# Patient Record
Sex: FEMALE | Race: WHITE | NOT HISPANIC OR LATINO | Employment: FULL TIME | URBAN - METROPOLITAN AREA
[De-identification: names, ages, dates, MRNs, and addresses within clinical notes are randomized per-mention and may not be internally consistent; named-entity substitution may affect disease eponyms.]

---

## 2017-03-07 ENCOUNTER — OFFICE VISIT - HEALTHEAST (OUTPATIENT)
Dept: FAMILY MEDICINE | Facility: CLINIC | Age: 47
End: 2017-03-07

## 2017-03-07 DIAGNOSIS — R51.9 HEADACHE: ICD-10-CM

## 2017-04-04 ENCOUNTER — OFFICE VISIT - HEALTHEAST (OUTPATIENT)
Dept: FAMILY MEDICINE | Facility: CLINIC | Age: 47
End: 2017-04-04

## 2017-04-04 DIAGNOSIS — R51.9 HEADACHE: ICD-10-CM

## 2017-04-19 ENCOUNTER — COMMUNICATION - HEALTHEAST (OUTPATIENT)
Dept: FAMILY MEDICINE | Facility: CLINIC | Age: 47
End: 2017-04-19

## 2017-04-19 DIAGNOSIS — R51.9 HEADACHE: ICD-10-CM

## 2017-07-05 ENCOUNTER — COMMUNICATION - HEALTHEAST (OUTPATIENT)
Dept: FAMILY MEDICINE | Facility: CLINIC | Age: 47
End: 2017-07-05

## 2017-07-05 DIAGNOSIS — R51.9 HEADACHE: ICD-10-CM

## 2017-07-25 ENCOUNTER — OFFICE VISIT - HEALTHEAST (OUTPATIENT)
Dept: FAMILY MEDICINE | Facility: CLINIC | Age: 47
End: 2017-07-25

## 2017-07-25 DIAGNOSIS — R51.9 HEADACHE: ICD-10-CM

## 2017-07-25 DIAGNOSIS — R42 DIZZINESS: ICD-10-CM

## 2017-08-01 ENCOUNTER — COMMUNICATION - HEALTHEAST (OUTPATIENT)
Dept: FAMILY MEDICINE | Facility: CLINIC | Age: 47
End: 2017-08-01

## 2017-08-01 DIAGNOSIS — R51.9 HEADACHE: ICD-10-CM

## 2017-08-01 DIAGNOSIS — R42 DIZZINESS: ICD-10-CM

## 2017-08-10 ENCOUNTER — COMMUNICATION - HEALTHEAST (OUTPATIENT)
Dept: FAMILY MEDICINE | Facility: CLINIC | Age: 47
End: 2017-08-10

## 2017-08-10 DIAGNOSIS — R51.9 HEADACHE: ICD-10-CM

## 2017-08-10 DIAGNOSIS — R42 DIZZINESS: ICD-10-CM

## 2017-08-15 ENCOUNTER — OFFICE VISIT - HEALTHEAST (OUTPATIENT)
Dept: FAMILY MEDICINE | Facility: CLINIC | Age: 47
End: 2017-08-15

## 2017-08-15 ENCOUNTER — COMMUNICATION - HEALTHEAST (OUTPATIENT)
Dept: TELEHEALTH | Facility: CLINIC | Age: 47
End: 2017-08-15

## 2017-08-15 DIAGNOSIS — R42 DIZZINESS: ICD-10-CM

## 2017-09-01 ENCOUNTER — COMMUNICATION - HEALTHEAST (OUTPATIENT)
Dept: FAMILY MEDICINE | Facility: CLINIC | Age: 47
End: 2017-09-01

## 2017-09-01 DIAGNOSIS — R51.9 HEADACHE: ICD-10-CM

## 2017-09-24 ENCOUNTER — HEALTH MAINTENANCE LETTER (OUTPATIENT)
Age: 47
End: 2017-09-24

## 2017-10-11 ENCOUNTER — OFFICE VISIT - HEALTHEAST (OUTPATIENT)
Dept: FAMILY MEDICINE | Facility: CLINIC | Age: 47
End: 2017-10-11

## 2017-10-11 DIAGNOSIS — H93.19 TINNITUS: ICD-10-CM

## 2017-10-11 DIAGNOSIS — R42 DIZZINESS: ICD-10-CM

## 2017-10-19 ENCOUNTER — COMMUNICATION - HEALTHEAST (OUTPATIENT)
Dept: OTOLARYNGOLOGY | Facility: CLINIC | Age: 47
End: 2017-10-19

## 2018-01-06 ENCOUNTER — COMMUNICATION - HEALTHEAST (OUTPATIENT)
Dept: FAMILY MEDICINE | Facility: CLINIC | Age: 48
End: 2018-01-06

## 2018-01-06 DIAGNOSIS — R51.9 HEADACHE: ICD-10-CM

## 2021-05-30 VITALS — BODY MASS INDEX: 25.7 KG/M2 | WEIGHT: 164.06 LBS

## 2021-05-30 VITALS — BODY MASS INDEX: 25.62 KG/M2 | WEIGHT: 163.56 LBS

## 2021-05-31 VITALS — BODY MASS INDEX: 25.42 KG/M2 | WEIGHT: 162.31 LBS

## 2021-05-31 VITALS — BODY MASS INDEX: 25.37 KG/M2 | WEIGHT: 162 LBS

## 2021-05-31 VITALS — BODY MASS INDEX: 25.06 KG/M2 | WEIGHT: 160 LBS

## 2021-06-03 ENCOUNTER — RECORDS - HEALTHEAST (OUTPATIENT)
Dept: ADMINISTRATIVE | Facility: CLINIC | Age: 51
End: 2021-06-03

## 2021-06-05 ENCOUNTER — RECORDS - HEALTHEAST (OUTPATIENT)
Dept: NEUROLOGY | Facility: CLINIC | Age: 51
End: 2021-06-05

## 2021-06-05 DIAGNOSIS — G43.909 MIGRAINE: ICD-10-CM

## 2021-06-05 DIAGNOSIS — R42 DIZZINESS AND GIDDINESS: ICD-10-CM

## 2021-06-09 NOTE — PROGRESS NOTES
46-year-old female with chronic migraine who is only taking Imitrex for abortive therapy.  She has 10 or less headaches per month but they can last up to 12 hours even with abortive treatment. I will reintroduce Topamax for preventative therapy.  She is to follow-up via phone call in 1 month with an update.       ASSESSMENT/PLAN:  1. Headache  - topiramate (TOPAMAX) 25 MG tablet; Take 1 tablet (25 mg total) by mouth bedtime.  Dispense: 30 tablet; Refill: 0  - SUMAtriptan (IMITREX) 25 MG tablet; Take 1 tablet (25 mg total) by mouth every 2 (two) hours as needed for migraine. Do not exceed 200mg/24 hours  Dispense: 10 tablet; Refill: 3    CHIEF COMPLAINT:  Chief Complaint   Patient presents with     Follow-up     migraine     Medication Refill     imitrex       HISTORY OF PRESENT ILLNESS:  Palma is a 46 y.o. female presenting to the clinic today for a follow up for migraines. She has gotten them sine she was 11 years old. The have significantly worsened in the past 2-3 years. The headaches are similar in presentation.  She gets about 10 days of headache free per month.  She has about 5-9 intense headaches per month.  Her headaches can last more than one day. She does take Imitrex for a headache, and this can cut down the duration of the headache. She did try Topamax, and she had clearance of her headaches, and stopped taking it.  She can use her whole Imitrex prescription some months. She grades her headaches at a 7/10. She has missed worked due to headaches. She has nausea, dizziness, blurred vision, photophobia and sensitivities to sound during a headache. She feels her headaches in the back of her head and she has some associated neck tightness. She cannot pinpoint any triggers to her headaches.     REVIEW OF SYSTEMS:    No new medications, diets, stress. No change in appetite. No fevers or chills.   All other systems are negative.    PFSH:  No new history.     TOBACCO USE:  History   Smoking Status     Never  Smoker   Smokeless Tobacco     Never Used       VITALS:  Vitals:    03/07/17 1121   BP: 92/52   Patient Site: Left Arm   Patient Position: Sitting   Cuff Size: Adult Regular   Pulse: 72   Weight: 163 lb 9 oz (74.2 kg)     Wt Readings from Last 3 Encounters:   03/07/17 163 lb 9 oz (74.2 kg)   08/31/16 158 lb 12.8 oz (72 kg)   05/18/16 161 lb (73 kg)       PHYSICAL EXAM:  Constitutional: Patient is oriented to person, place, and time. Patient appears well-developed and well-nourished. No distress.   Cardiovascular: Normal rate.  Pulmonary/Chest: Effort normal. No respiratory distress.   Neurological: Patient is alert and oriented to person, place, and time.      Results for orders placed or performed in visit on 10/21/14   ELECTROLYTE PROFILE   Result Value Ref Range    Sodium 142 136 - 145 mmol/L    Potassium 4.0 3.5 - 5.0 mmol/L    CO2 21 (L) 22 - 31 mmol/L    Chloride 111 (H) 98 - 107 mmol/L    Anion Gap, Calculation 10 5 - 18 mmol/L         ADDITIONAL HISTORY SUMMARIZED (2): None.  DECISION TO OBTAIN EXTRA INFORMATION (1): None.   RADIOLOGY TESTS (1): None.  LABS (1): None.  MEDICINE TESTS (1): None.  INDEPENDENT REVIEW (2 each): None.     The visit lasted a total of 18 minutes face to face with the patient. Over 50% of the time was spent counseling and educating the patient about headaches.    IAlexandra, am scribing for and in the presence of, Dr. Cole.    I, Dr. Cole, personally performed the services described in this documentation, as scribed by Alexandra Godwin in my presence, and it is both accurate and complete.    MEDICATIONS:  Current Outpatient Prescriptions   Medication Sig Dispense Refill     CALCIUM & MAGNESIUM CARBONATES ORAL Take by mouth.       ERGOCALCIFEROL, VITAMIN D2, (VITAMIN D2 ORAL) Take by mouth.       FEXOFENADINE HCL (ALLEGRA ORAL) Take by mouth.       SUMAtriptan (IMITREX) 25 MG tablet Take 1 tablet (25 mg total) by mouth every 2 (two) hours as needed for  migraine. Do not exceed 200mg/24 hours 10 tablet 3     topiramate (TOPAMAX) 25 MG tablet Take 1 tablet (25 mg total) by mouth bedtime. 30 tablet 0     No current facility-administered medications for this visit.        Total data points: 0       n/a

## 2021-06-09 NOTE — PROGRESS NOTES
46-year-old female with headaches.  Headaches are better with the initiation of Topamax 25 mg.  She has been having about 3-4 migraines a month but only requiring the use of Imitrex 1-2 times per month.  The frequency of headaches have lessened since starting Topamax.  Continue with Topamax and as needed Imitrex.  Follow-up in 6 months to 12 months.  FMLA form signed and a copy provided to the patient.    ASSESSMENT/PLAN:  1. Headache    CHIEF COMPLAINT:  Chief Complaint   Patient presents with     Follow-up     migraine, lot better     Fmla Paperwork     Will bring later       HISTORY OF PRESENT ILLNESS:  Palma is a 46 y.o. female presenting to the clinic today for a migraine follow. Restarting the Topamax has worked well for her. She has only need Imitrex 2 times after restarted the Topamax. She feels like her lingering constant headache has subsided. Her appetite has been stable on the Topamax. She would also like her FMLA updated for her migraines.     Headaches went from 10 days per month to about 3-4 days per month with the initiation of Topamax.  Previously headaches were lasting 12+ hours per day.  At present there lasting 1-2 hours per day.  Patient has noted a significant improvement with Topamax.    REVIEW OF SYSTEMS:   Constitutional: Negative.   HENT: Negative.   Eyes: Negative.   Respiratory: Negative.   Cardiovascular: Negative.   Gastrointestinal: Negative.   Endocrine: Negative.   Genitourinary: Negative.   Musculoskeletal: Negative.   Skin: Negative.   Allergic/Immunologic: Negative.   Neurological: Negative.   Hematological: Negative.   Psychiatric/Behavioral: Negative.   All other systems are negative.    PFSH:  No new history.     TOBACCO USE:  History   Smoking Status     Never Smoker   Smokeless Tobacco     Never Used       VITALS:  Vitals:    04/04/17 1015   BP: 98/52   Patient Site: Left Arm   Patient Position: Sitting   Cuff Size: Adult Regular   Pulse: 60   Weight: 164 lb 1 oz (74.4 kg)      Wt Readings from Last 3 Encounters:   04/04/17 164 lb 1 oz (74.4 kg)   03/07/17 163 lb 9 oz (74.2 kg)   08/31/16 158 lb 12.8 oz (72 kg)       PHYSICAL EXAM:  Constitutional: Patient is oriented to person, place, and time. Patient appears well-developed and well-nourished. No distress.   Cardiovascular: Normal rate.  Pulmonary/Chest: Effort normal. No respiratory distress.   Neurological: Patient is alert and oriented to person, place, and time.      Results for orders placed or performed in visit on 10/21/14   ELECTROLYTE PROFILE   Result Value Ref Range    Sodium 142 136 - 145 mmol/L    Potassium 4.0 3.5 - 5.0 mmol/L    CO2 21 (L) 22 - 31 mmol/L    Chloride 111 (H) 98 - 107 mmol/L    Anion Gap, Calculation 10 5 - 18 mmol/L           ADDITIONAL HISTORY SUMMARIZED (2): None.  DECISION TO OBTAIN EXTRA INFORMATION (1): None.   RADIOLOGY TESTS (1): None.  LABS (1): None.  MEDICINE TESTS (1): None.  INDEPENDENT REVIEW (2 each): None.     The visit lasted a total of 5 minutes face to face with the patient. Over 50% of the time was spent counseling and educating the patient about migraine management.    IAlexandra, am scribing for and in the presence of, Dr. Cole.    IDr. Cole, personally performed the services described in this documentation, as scribed by Alexandra Godwin in my presence, and it is both accurate and complete.    MEDICATIONS:  Current Outpatient Prescriptions   Medication Sig Dispense Refill     CALCIUM & MAGNESIUM CARBONATES ORAL Take by mouth.       ERGOCALCIFEROL, VITAMIN D2, (VITAMIN D2 ORAL) Take by mouth.       FEXOFENADINE HCL (ALLEGRA ORAL) Take by mouth.       ondansetron (ZOFRAN-ODT) 2 MG Take 4 mg by mouth every 8 (eight) hours as needed for nausea.       SUMAtriptan (IMITREX) 25 MG tablet Take 1 tablet (25 mg total) by mouth every 2 (two) hours as needed for migraine. Do not exceed 200mg/24 hours 10 tablet 3     topiramate (TOPAMAX) 25 MG tablet Take 1 tablet (25 mg  total) by mouth bedtime. 30 tablet 0     ondansetron (ZOFRAN) 8 MG tablet        No current facility-administered medications for this visit.        Total data points: 0

## 2021-06-12 NOTE — PROGRESS NOTES
47-year-old female with chronic migraine which is better on Topamax (started April 2017presents) presents with new onset dizziness without vertigo which started 4 weeks ago.  She also has associated tinnitus and change in hearing.  The patient is taking meclizine for the dizziness which has been helpful.  Timing of dizziness with Topamax may indicate medication side effects.  I will like her to wean off Topamax every other day for 2 weeks.  Continue with meclizine for 1 week and then discontinue.  Monitor for signs of dizziness and also for signs of worsening headaches.  Recent laboratory tests done by another provider were unremarkable.  Call in 2-3 weeks with an update.  May need head imaging.  May need referral to ENT to rule out Ménière's disease    ASSESSMENT/PLAN:  1. Dizziness    CHIEF COMPLAINT:  Chief Complaint   Patient presents with     Follow-up     migraine is better, but still dizzy is fine if taking med Antivert, if skip the med dizziness comes back       HISTORY OF PRESENT ILLNESS:  Palma is a 47 y.o. female presenting to the clinic today for a follow up for dizziness and headaches. Her headaches are improved. She is still having dizziness. The dizziness has been ongoing for about 1.5 months. She feels like the dizziness is worse. She did have some dizziness before the headaches and starting Topamax. She has been taking Topamax for the past 4 months, and has been on it prior to this. She has been taking constant meclizine, and this helps the dizziness. If she stops the meclizine, the dizziness comes back. Her dizziness is NOT room spinning, and not change in position related. She does have associated nausea. She can have the dizziness at rest or with activity. The episodes can be so severe that she can just lay down. She has had some feelings of imbalance with the dizziness. She is having intermittent ringing in the ears. She has felt like her hearing is slightly decreased. The ringing and hearing  changes are new with the dizziness. Vitals are stable. She had normal head CT three years ago.     REVIEW OF SYSTEMS:   Denies cough, cold, rhinorrhea, pressure in face. Denies otalgia. No changes in diet. No new medications or supplements. No vomiting or diarrhea. All other systems are negative.    PFSH:  No new history.     TOBACCO USE:  History   Smoking Status     Never Smoker   Smokeless Tobacco     Never Used       VITALS:  Vitals:    08/15/17 1028   BP: 110/70   Patient Site: Left Arm   Patient Position: Sitting   Cuff Size: Adult Regular   Pulse: 68   Weight: 162 lb 5 oz (73.6 kg)     Wt Readings from Last 3 Encounters:   08/15/17 162 lb 5 oz (73.6 kg)   07/25/17 160 lb (72.6 kg)   04/04/17 164 lb 1 oz (74.4 kg)       PHYSICAL EXAM:  Constitutional: Patient is oriented to person, place, and time. Patient appears well-developed and well-nourished. No distress.   Head: Normocephalic and atraumatic.   Right Ear: External ear normal.   Left Ear: External ear normal.   Nose: Nose normal.   Mouth/Throat: Oropharynx is clear and moist. No oropharyngeal exudate.   Eyes: Conjunctivae and EOM are normal. Pupils are equal, round, and reactive to light. Right eye exhibits no discharge. Left eye exhibits no discharge. No scleral icterus.   Neck: Neck supple. No JVD present. No tracheal deviation present. No thyromegaly present.   Cardiovascular: Normal rate, regular rhythm, normal heart sounds and intact distal pulses. No murmur heard.   Pulmonary/Chest: Effort normal and breath sounds normal. No stridor. No respiratory distress. Patient has no wheezes, no rales, exhibits no tenderness.   Abdominal: Soft. Bowel sounds are normal. Patient exhibits no distension and no mass. There is no tenderness. There is no rebound and no guarding.   Lymphadenopathy:  Patient has no cervical adenopathy.   Neurological: Patient is alert and oriented to person, place, and time. Patient has normal reflexes. No cranial nerve deficit.  Coordination normal.   Skin: Skin is warm and dry. No rash noted. Patient is not diaphoretic. No erythema. No pallor.    Results for orders placed or performed in visit on 07/25/17   HM2(CBC w/o Differential)   Result Value Ref Range    WBC 5.6 4.0 - 11.0 thou/uL    RBC 4.59 3.80 - 5.40 mill/uL    Hemoglobin 14.3 12.0 - 16.0 g/dL    Hematocrit 42.5 35.0 - 47.0 %    MCV 92 80 - 100 fL    MCH 31.0 27.0 - 34.0 pg    MCHC 33.6 32.0 - 36.0 g/dL    RDW 11.1 11.0 - 14.5 %    Platelets 192 140 - 440 thou/uL    MPV 9.0 7.0 - 10.0 fL   Comprehensive Metabolic Panel   Result Value Ref Range    Sodium 143 136 - 145 mmol/L    Potassium 4.6 3.5 - 5.0 mmol/L    Chloride 110 (H) 98 - 107 mmol/L    CO2 26 22 - 31 mmol/L    Anion Gap, Calculation 7 5 - 18 mmol/L    Glucose 88 70 - 125 mg/dL    BUN 16 8 - 22 mg/dL    Creatinine 0.81 0.60 - 1.10 mg/dL    GFR MDRD Af Amer >60 >60 mL/min/1.73m2    GFR MDRD Non Af Amer >60 >60 mL/min/1.73m2    Bilirubin, Total 0.7 0.0 - 1.0 mg/dL    Calcium 8.9 8.5 - 10.5 mg/dL    Protein, Total 6.7 6.0 - 8.0 g/dL    Albumin 3.7 3.5 - 5.0 g/dL    Alkaline Phosphatase 55 45 - 120 U/L    AST 17 0 - 40 U/L    ALT 13 0 - 45 U/L   Thyroid Cascade   Result Value Ref Range    TSH 1.83 0.30 - 5.00 uIU/mL           ADDITIONAL HISTORY SUMMARIZED (2): Reviewed note from Lynne Solitario NP regarding headaches and dizziness from 7/25/2017.  DECISION TO OBTAIN EXTRA INFORMATION (1): None.   RADIOLOGY TESTS (1): None.  LABS (1): Reviewed labs from 7/25/2017.  MEDICINE TESTS (1): None.  INDEPENDENT REVIEW (2 each): None.     Alexandra THAPA am scribing for and in the presence of, Dr. Cole.    I, Luis Sanches MD , personally performed the services described in this documentation, as scribed by Alexandra Godwin in my presence, and it is both accurate and complete.    MEDICATIONS:  Current Outpatient Prescriptions   Medication Sig Dispense Refill     CALCIUM & MAGNESIUM CARBONATES ORAL Take  by mouth.       ERGOCALCIFEROL, VITAMIN D2, (VITAMIN D2 ORAL) Take by mouth.       FEXOFENADINE HCL (ALLEGRA ORAL) Take by mouth.       meclizine (ANTIVERT) 12.5 mg tablet TAKE 2 TABLETS (25 MG TOTAL) BY MOUTH 3 (THREE) TIMES A DAY AS NEEDED FOR DIZZINESS OR NAUSEA. 60 tablet 10     ondansetron (ZOFRAN) 8 MG tablet        SUMAtriptan (IMITREX) 25 MG tablet Take 1 tablet (25 mg total) by mouth every 2 (two) hours as needed for migraine. Do not exceed 200mg/24 hours 10 tablet 3     topiramate (TOPAMAX) 25 MG tablet TAKE 1 TABLET (25 MG TOTAL) BY MOUTH BEDTIME. 30 tablet 11     No current facility-administered medications for this visit.        Total data points: 3

## 2021-06-12 NOTE — PROGRESS NOTES
"Assessment/Plan:        Diagnoses and all orders for this visit:    Headache  Dizziness  Neurologically intact today. Vital signs stable.   I suspect this increase dizziness could be from her prophylactic medication Topamax vs ?.   I will order labs below to rule out any underlying cause and follow up with results once received,   Prescribed meclizine to try tonight to see if that helps dizziness, discussed side effects as well.   Toradol given in office today and Palma did tolerate this well to assist with pain.  Reviewed last imaging from 2014.  I did recommend follow up with PCP next week to discuss imaging, vs neurology referral vs change in prophylactic medication.  Discussed red flags that would warrant urgent evaluation in [facility] or ED. Patient verbalized understanding.      -     HM2(CBC w/o Differential)  -     Comprehensive Metabolic Panel  -     Thyroid Cascade  -     meclizine (ANTIVERT) 12.5 mg tablet; Take 2 tablets (25 mg total) by mouth 3 (three) times a day as needed for dizziness or nausea.  Dispense: 20 tablet; Refill: 0  -     ketorolac injection 30 mg (TORADOL); Inject 1 mL (30 mg total) into the shoulder, thigh, or buttocks once.        Subjective:    Patient ID: Palma Fang is a 47 y.o. female.    HPI Comments: Palma is a 47 y.o. female presenting to the clinic today for evaluation of a headache/history of migraines and dizziness. States she has had migaraines for over 35 years. She has had them her entire life,  has oonly started treatment for her headaches two years ago.   Denies any aura, she does get sensitivity to light and sound - she gets them about once per week. She is unsure what her triggers are.   She is taking Topamax daily and she takes Imitrex, ibuprofen and Zofran when she gets them. Endorses recent increased dizziness.  She presents today with a migraine that started Sunday 7/23/2017 - She has been taking Imitrex, but this is not \"knocking it out\" as she states her headache " continues to linger. Palma is most concerned with her increased dizziness.  She states that dizziness and nausea started Sunday afternoon  - she states the dizziness is new, she will get dizziness but it doesn't typically last this long. She did take Zofran without relief.   She has been on topamax for at least one year.   Denies vertigo. Denies vision changes, balance or personality changes. She did have a formal eye evaluation within the year but notes she is using her reader eye glasses more and feels her vision is changing.   She is missing a lot of work again and feels that her symptoms are affecting her quality of life.    She has not taken anything over the counter. Rates 4/10 today  Denies fever, chills, recent viral illness. Denies worse headache of her life.   Does not have a current neurologist.     Headache    Associated symptoms include dizziness. Pertinent negatives include no numbness, seizures or weakness.       The following portions of the patient's history were reviewed and updated as appropriate: allergies, current medications, past family history, past medical history, past social history, past surgical history and problem list.    Review of Systems   Constitutional: Negative.    Respiratory: Negative.    Cardiovascular: Negative.    Neurological: Positive for dizziness and headaches. Negative for tremors, seizures, syncope, facial asymmetry, speech difficulty, weakness, light-headedness and numbness.           Objective:    Physical Exam   Constitutional: She is oriented to person, place, and time. She appears well-developed and well-nourished. No distress.   Cardiovascular: Normal rate and normal heart sounds.    No murmur heard.  Pulmonary/Chest: Effort normal and breath sounds normal.   Musculoskeletal: Normal range of motion.   Neurological: She is alert and oriented to person, place, and time. She displays normal reflexes. No cranial nerve deficit. She exhibits normal muscle tone.  Coordination normal.   Skin: She is not diaphoretic.   Psychiatric: She has a normal mood and affect. Her behavior is normal. Judgment normal.             Vitals:    07/25/17 1040   BP: 128/76   Patient Site: Left Arm   Patient Position: Sitting   Cuff Size: Adult Regular   Pulse: 68   Weight: 160 lb (72.6 kg)         Current Outpatient Prescriptions   Medication Sig Dispense Refill     CALCIUM & MAGNESIUM CARBONATES ORAL Take by mouth.       ERGOCALCIFEROL, VITAMIN D2, (VITAMIN D2 ORAL) Take by mouth.       FEXOFENADINE HCL (ALLEGRA ORAL) Take by mouth.       ondansetron (ZOFRAN) 8 MG tablet        ondansetron (ZOFRAN-ODT) 2 MG Take 4 mg by mouth every 8 (eight) hours as needed for nausea.       SUMAtriptan (IMITREX) 25 MG tablet Take 1 tablet (25 mg total) by mouth every 2 (two) hours as needed for migraine. Do not exceed 200mg/24 hours 10 tablet 3     topiramate (TOPAMAX) 25 MG tablet TAKE 1 TABLET (25 MG TOTAL) BY MOUTH BEDTIME. 30 tablet 11     meclizine (ANTIVERT) 12.5 mg tablet Take 2 tablets (25 mg total) by mouth 3 (three) times a day as needed for dizziness or nausea. 20 tablet 0     No current facility-administered medications for this visit.

## 2021-06-13 NOTE — PROGRESS NOTES
ASSESSMENT/PLAN:  47-year-old female with chronic migraine follows up on 2 months of dizziness and tinnitus without vertigo or hearing loss.  Initially this was thought related to topamax (for migraine) due to the timing of symptoms with timing of medication; however, dizziness and tinnitus persist even with discontinuation of topamax.  She has been taking meclizine daily for the last 2 months.  Will refer to ENT for further evaluation and management    Dizziness  -     Ambulatory referral to ENT    Tinnitus  -     Ambulatory referral to ENT    Orders Placed This Encounter   Procedures     Ambulatory referral to ENT     Referral Priority:   Routine     Referral Type:   Consultation     Referral Reason:   Evaluation and Treatment     Requested Specialty:   Otolaryngology     Number of Visits Requested:   1       CHIEF COMPLAINT:  Chief Complaint   Patient presents with     Follow-up     migraine     discuss [place] papers     Medication Refill       HISTORY OF PRESENT ILLNESS:  Palma is a 47 y.o. female presenting to the clinic today for a follow up for headaches and dizziness. The headaches seem stable. She is completely off of the Topamax. If she has a headache, the Imitrex works well for her. She is still experiencing the dizziness daily. She is taking meclizine twice daily; she takes one dose when she wakes up in the morning, and another dose midday for the dizziness. She is still having tinnitus. Denies any hearing loss or sense of decrease hearing.      She also comes with [facility] paperwork to be updated.     REVIEW OF SYSTEMS:   Constitutional: Negative.   HENT: Negative.   Eyes: Negative.   Respiratory: Negative.   Cardiovascular: Negative.   Gastrointestinal: Negative.   Endocrine: Negative.   Genitourinary: Negative.   Musculoskeletal: Negative.   Skin: Negative.   Allergic/Immunologic: Negative.   Neurological: Negative.   Hematological: Negative.   Psychiatric/Behavioral: Negative.   All other systems are  negative.    PFSH:  No new history.     TOBACCO USE:  History   Smoking Status     Never Smoker   Smokeless Tobacco     Never Used       VITALS:  Vitals:    10/11/17 1140   BP: 100/50   Pulse: (!) 56   Weight: 162 lb (73.5 kg)     Wt Readings from Last 3 Encounters:   10/11/17 162 lb (73.5 kg)   08/15/17 162 lb 5 oz (73.6 kg)   07/25/17 160 lb (72.6 kg)       PHYSICAL EXAM:  Constitutional: Patient is oriented to person, place, and time. Patient appears well-developed and well-nourished. No distress.   Head: Normocephalic and atraumatic.   Right Ear: External ear normal.   Left Ear: External ear normal.   Nose: Nose normal.   Eyes: Conjunctivae and EOM are normal. Right eye exhibits no discharge. Left eye exhibits no discharge. No scleral icterus.   Skin: Skin is warm and dry. No rash noted. Patient is not diaphoretic. No erythema. No pallor.    Results for orders placed or performed in visit on 07/25/17   HM2(CBC w/o Differential)   Result Value Ref Range    WBC 5.6 4.0 - 11.0 thou/uL    RBC 4.59 3.80 - 5.40 mill/uL    Hemoglobin 14.3 12.0 - 16.0 g/dL    Hematocrit 42.5 35.0 - 47.0 %    MCV 92 80 - 100 fL    MCH 31.0 27.0 - 34.0 pg    MCHC 33.6 32.0 - 36.0 g/dL    RDW 11.1 11.0 - 14.5 %    Platelets 192 140 - 440 thou/uL    MPV 9.0 7.0 - 10.0 fL   Comprehensive Metabolic Panel   Result Value Ref Range    Sodium 143 136 - 145 mmol/L    Potassium 4.6 3.5 - 5.0 mmol/L    Chloride 110 (H) 98 - 107 mmol/L    CO2 26 22 - 31 mmol/L    Anion Gap, Calculation 7 5 - 18 mmol/L    Glucose 88 70 - 125 mg/dL    BUN 16 8 - 22 mg/dL    Creatinine 0.81 0.60 - 1.10 mg/dL    GFR MDRD Af Amer >60 >60 mL/min/1.73m2    GFR MDRD Non Af Amer >60 >60 mL/min/1.73m2    Bilirubin, Total 0.7 0.0 - 1.0 mg/dL    Calcium 8.9 8.5 - 10.5 mg/dL    Protein, Total 6.7 6.0 - 8.0 g/dL    Albumin 3.7 3.5 - 5.0 g/dL    Alkaline Phosphatase 55 45 - 120 U/L    AST 17 0 - 40 U/L    ALT 13 0 - 45 U/L   Thyroid Cascade   Result Value Ref Range    TSH 1.83 0.30  - 5.00 uIU/mL           ADDITIONAL HISTORY SUMMARIZED (2): None.  DECISION TO OBTAIN EXTRA INFORMATION (1): None.   RADIOLOGY TESTS (1): None.  LABS (1): None.  MEDICINE TESTS (1): None.  INDEPENDENT REVIEW (2 each): None.     The visit lasted a total of 5 minutes face to face with the patient. Over 50% of the time was spent counseling and educating the patient about her dizziness.    I, Alexandra Godwin, am scribing for and in the presence of, Dr. Cole.    ILuis MD , personally performed the services described in this documentation, as scribed by Alexandra Godwin in my presence, and it is both accurate and complete.    MEDICATIONS:  Current Outpatient Prescriptions   Medication Sig Dispense Refill     CALCIUM & MAGNESIUM CARBONATES ORAL Take by mouth.       ERGOCALCIFEROL, VITAMIN D2, (VITAMIN D2 ORAL) Take by mouth.       FEXOFENADINE HCL (ALLEGRA ORAL) Take by mouth.       meclizine (ANTIVERT) 12.5 mg tablet TAKE 2 TABLETS (25 MG TOTAL) BY MOUTH 3 (THREE) TIMES A DAY AS NEEDED FOR DIZZINESS OR NAUSEA. 60 tablet 10     ondansetron (ZOFRAN) 8 MG tablet Take 1 tablet (8 mg total) by mouth every 8 (eight) hours as needed for nausea. 30 tablet 1     SUMAtriptan (IMITREX) 25 MG tablet Take 1 tablet (25 mg total) by mouth every 2 (two) hours as needed for migraine. Do not exceed 200mg/24 hours 10 tablet 3     ondansetron (ZOFRAN) 8 MG tablet        topiramate (TOPAMAX) 25 MG tablet TAKE 1 TABLET (25 MG TOTAL) BY MOUTH BEDTIME. 30 tablet 11     No current facility-administered medications for this visit.        Total data points: 0

## 2022-07-21 ENCOUNTER — TRANSFERRED RECORDS (OUTPATIENT)
Dept: HEALTH INFORMATION MANAGEMENT | Facility: CLINIC | Age: 52
End: 2022-07-21

## 2022-07-28 ENCOUNTER — LAB REQUISITION (OUTPATIENT)
Dept: LAB | Facility: CLINIC | Age: 52
End: 2022-07-28

## 2022-07-28 PROCEDURE — 86481 TB AG RESPONSE T-CELL SUSP: CPT | Performed by: INTERNAL MEDICINE

## 2022-07-30 LAB
GAMMA INTERFERON BACKGROUND BLD IA-ACNC: 0.06 IU/ML
M TB IFN-G BLD-IMP: NEGATIVE
M TB IFN-G CD4+ BCKGRND COR BLD-ACNC: 3.84 IU/ML
MITOGEN IGNF BCKGRD COR BLD-ACNC: 0 IU/ML
MITOGEN IGNF BCKGRD COR BLD-ACNC: 0 IU/ML
QUANTIFERON MITOGEN: 3.9 IU/ML
QUANTIFERON NIL TUBE: 0.06 IU/ML
QUANTIFERON TB1 TUBE: 0.06 IU/ML
QUANTIFERON TB2 TUBE: 0.06

## 2023-03-29 ENCOUNTER — ANCILLARY PROCEDURE (OUTPATIENT)
Dept: MAMMOGRAPHY | Facility: CLINIC | Age: 53
End: 2023-03-29
Attending: FAMILY MEDICINE
Payer: COMMERCIAL

## 2023-03-29 DIAGNOSIS — Z12.31 VISIT FOR SCREENING MAMMOGRAM: ICD-10-CM

## 2023-03-29 PROCEDURE — 77067 SCR MAMMO BI INCL CAD: CPT

## 2023-05-15 ENCOUNTER — OFFICE VISIT (OUTPATIENT)
Dept: FAMILY MEDICINE | Facility: CLINIC | Age: 53
End: 2023-05-15
Payer: COMMERCIAL

## 2023-05-15 VITALS
SYSTOLIC BLOOD PRESSURE: 123 MMHG | HEART RATE: 69 BPM | OXYGEN SATURATION: 98 % | RESPIRATION RATE: 16 BRPM | BODY MASS INDEX: 23.52 KG/M2 | DIASTOLIC BLOOD PRESSURE: 83 MMHG | WEIGHT: 150.2 LBS | TEMPERATURE: 97.7 F

## 2023-05-15 DIAGNOSIS — M79.671 RIGHT FOOT PAIN: Primary | ICD-10-CM

## 2023-05-15 PROCEDURE — 99203 OFFICE O/P NEW LOW 30 MIN: CPT | Performed by: PREVENTIVE MEDICINE

## 2023-05-15 RX ORDER — OXYCODONE AND ACETAMINOPHEN 5; 325 MG/1; MG/1
1 TABLET ORAL EVERY 6 HOURS PRN
Qty: 12 TABLET | Refills: 0 | Status: SHIPPED | OUTPATIENT
Start: 2023-05-15 | End: 2023-05-18

## 2023-05-16 NOTE — PROGRESS NOTES
Assessment & Plan   1. Right foot pain  - oxyCODONE-acetaminophen (PERCOCET) 5-325 MG tablet; Take 1 tablet by mouth every 6 hours as needed for pain  Dispense: 12 tablet; Refill: 0    Wound healing well.    Tylenol 500 mg three times per day  Elevate, ice  Percocet for breakthrough pain             No follow-ups on file.    Easton Carroll MD  [facility]    Subjective     Palma Fang is a 52 year old year old female who presents to clinic today for the following health issues:    Patient presents with:  Foot Injury: Seen ED yesterday. Rt foot pain.    This is a 51 yo female who presents with r foot pain for 1 days.  Had a drill bit injure her right great toe yesterday and was seen in the ED and had 2 sutures placed.  Toe was numbed so she wasn't having pain but now is having a lot of pain.  Has tried tylenol and ibuprofen which have not helped.      Patient Active Problem List   Diagnosis     High-risk pregnancy, elderly primigravida     CARDIOVASCULAR SCREENING; LDL GOAL LESS THAN 160       Current Outpatient Medications   Medication     oxyCODONE-acetaminophen (PERCOCET) 5-325 MG tablet     PRENATAL VITAMIN TABS   OR     REGLAN 10 MG OR TABS     ZOFRAN 4 MG OR TABS     No current facility-administered medications for this visit.       No past medical history on file.    Social History   reports that she has never smoked. She has never used smokeless tobacco. She reports that she does not drink alcohol and does not use drugs.    Family History   Problem Relation Age of Onset     Breast Cancer Maternal Grandmother      Breast Cancer Paternal Aunt        Review of Systems  Constitutional, HEENT, cardiovascular, pulmonary, GI, , musculoskeletal, neuro, skin, endocrine and psych systems are negative, except as otherwise noted.      Objective    /83   Pulse 69   Temp 97.7  F (36.5  C) (Oral)   Resp 16   Wt 68.1 kg (150 lb 3.2 oz)   SpO2 98%   BMI 23.52 kg/m     Physical Exam   GENERAL: healthy, alert and no distress  EYES: Eyes grossly normal to inspection, PERRL and conjunctivae and sclerae normal  HENT: ear canals and TM's normal, nose and mouth without ulcers or lesions  NECK: no adenopathy, no asymmetry, masses, or scars and thyroid normal to palpation  RESP: lungs clear to auscultation - no rales, rhonchi or wheezes  BREAST: normal without masses, tenderness or nipple discharge and no palpable axillary masses or adenopathy  CV: regular rate and rhythm, normal S1 S2, no S3 or S4, no murmur, click or rub, no peripheral edema and peripheral pulses strong  ABDOMEN: soft, nontender, no hepatosplenomegaly, no masses and bowel sounds normal  MS: no gross musculoskeletal defects noted, no edema  SKIN: no suspicious lesions or rashes  NEURO: Normal strength and tone, mentation intact and speech normal  PSYCH: mentation appears normal, affect normal/bright  FEET - r second toe with mild swelling and ttp, nl cms.  Wound healing.

## 2023-06-05 ENCOUNTER — OFFICE VISIT (OUTPATIENT)
Dept: FAMILY MEDICINE | Facility: CLINIC | Age: 53
End: 2023-06-05
Payer: COMMERCIAL

## 2023-06-05 VITALS
DIASTOLIC BLOOD PRESSURE: 72 MMHG | TEMPERATURE: 97 F | OXYGEN SATURATION: 100 % | SYSTOLIC BLOOD PRESSURE: 107 MMHG | HEART RATE: 70 BPM

## 2023-06-05 DIAGNOSIS — L08.9 TOE INFECTION: Primary | ICD-10-CM

## 2023-06-05 PROCEDURE — 99203 OFFICE O/P NEW LOW 30 MIN: CPT | Performed by: NURSE PRACTITIONER

## 2023-06-05 RX ORDER — SULFAMETHOXAZOLE/TRIMETHOPRIM 800-160 MG
1 TABLET ORAL 2 TIMES DAILY
Qty: 14 TABLET | Refills: 0 | Status: SHIPPED | OUTPATIENT
Start: 2023-06-05 | End: 2023-06-12

## 2023-06-05 ASSESSMENT — ENCOUNTER SYMPTOMS
FEVER: 0
CHILLS: 0
WOUND: 1
FATIGUE: 0

## 2023-06-05 NOTE — PROGRESS NOTES
Assessment & Plan       ICD-10-CM    1. Toe infection  L08.9 sulfamethoxazole-trimethoprim (BACTRIM DS) 800-160 MG tablet     Orthopedic  Referral           Patient instructions:  Take antibiotics as prescribed with food. Increase probiotics either through OTC medications or yogurts. Take antibiotics with food to decrease chance of GI upset. If no improvement or worsening symptoms please follow up with PCP or higher level of care. Follow up with podiatry.    Medical decision making:  Pt presents with foot pain s/p drill going through it 3 weeks ago. Area noted to still be draining purulent drainage and tender to palpation. She does report that she's able to ambulate on foot now, however as it is still draining purulent drainage 3 weeks after accident, will start patient on bactrim here today and have her follow up with podiatry. If anything worsens, go to ER. Pt agrees with plan.     No follow-ups on file.    At the end of the encounter, I discussed results, diagnosis, medications. Discussed red flags for immediate return to clinic/ER, as well as indications for follow up if no improvement. Patient understood and agreed to plan. Patient was stable for discharge.    Marc Waldrop is a 52 year old female who presents to clinic today the following health issues:  Chief Complaint   Patient presents with     Wounds     Was seen on 5/15 with no little improvement     Pt reports she dropped drill on Right 2nd toe back on 5/15, still having drainage and pain. Was on one week of keflex back on 5/15. Xrays at that time were negative for infection.           Review of Systems   Constitutional: Negative for chills, fatigue and fever.   Skin: Positive for wound.       Problem List:  2010-10: CARDIOVASCULAR SCREENING; LDL GOAL LESS THAN 160  2009-03: High-risk pregnancy, elderly primigravida  2009-03: Encounter for supervision of other normal pregnancy      No past medical history on file.    Social History      Tobacco Use     Smoking status: Never     Smokeless tobacco: Never   Vaping Use     Vaping status: Not on file   Substance Use Topics     Alcohol use: No           Objective    /72 (BP Location: Right arm, Patient Position: Sitting, Cuff Size: Adult Regular)   Pulse 70   Temp 97  F (36.1  C) (Tympanic)   SpO2 100%   Physical Exam  Vitals reviewed.   Constitutional:       Appearance: Normal appearance. She is not ill-appearing, toxic-appearing or diaphoretic.   Cardiovascular:      Rate and Rhythm: Normal rate.   Pulmonary:      Effort: Pulmonary effort is normal.   Skin:     Findings: Wound present.      Comments: Wound noted to top of 2nd toe on R foot with small amount of purulent discharge and small amount of surrounding erythema. Tender to palpation. Good ROM.    Neurological:      Mental Status: She is alert.                      OWEN GRAVES CNP

## 2023-06-19 ENCOUNTER — OFFICE VISIT (OUTPATIENT)
Dept: PODIATRY | Facility: CLINIC | Age: 53
End: 2023-06-19
Attending: NURSE PRACTITIONER
Payer: COMMERCIAL

## 2023-06-19 VITALS
WEIGHT: 150 LBS | HEIGHT: 66 IN | BODY MASS INDEX: 24.11 KG/M2 | HEART RATE: 63 BPM | SYSTOLIC BLOOD PRESSURE: 113 MMHG | DIASTOLIC BLOOD PRESSURE: 79 MMHG | OXYGEN SATURATION: 100 %

## 2023-06-19 DIAGNOSIS — S91.331A PUNCTURE WOUND OF RIGHT FOOT, INITIAL ENCOUNTER: Primary | ICD-10-CM

## 2023-06-19 PROCEDURE — 99203 OFFICE O/P NEW LOW 30 MIN: CPT | Performed by: PODIATRIST

## 2023-06-19 RX ORDER — NAPROXEN 500 MG/1
500 TABLET ORAL 2 TIMES DAILY WITH MEALS
Qty: 28 TABLET | Refills: 0 | Status: SHIPPED | OUTPATIENT
Start: 2023-06-19

## 2023-06-19 NOTE — PROGRESS NOTES
FOOT AND ANKLE SURGERY/PODIATRY CONSULT NOTE         ASSESSMENT: Puncture wound right foot      TREATMENT:  -I discussed with the patient that the puncture wound along the dorsal right forefoot at the base of the second digit appears to have a thin top-cover, without any open lesion noted.  There is also no signs of infection on exam today.    -X-rays obtained at the time of injury on 5/14 were read as negative for fracture or foreign body.    -We reviewed that because she has been walking in gym shoes over the past 5 weeks that her current symptoms may be related to improper offloading of the site of injury.  I recommend limited walking in a cam boot at this time and have referred her for a cam boot today.    -Reviewed that additional imaging in the form of an MRI may be necessary and that any type of retained foreign body may require surgical I&D at a later date if symptoms do not improve over the next 3 to 4 weeks.    -I will start her on naproxen to continue anti-inflammatory over the next 2 weeks.    -I recommend she keep the site of injury on the right foot dry and clean. Shower bag with showering.    -Patient's questions invited and answered. Patient to return to clinic in 3 week(s) for re-evaluation.  She was encouraged to call my office with any further questions or concerns.     Remy Edwards DPM  [facility]/Foot & Ankle Surgery      HPI: I was asked to see Palma VIOLA Fang today for a puncture wound on the right foot.  Patient reports that she dropped a drill on her right foot approximately 5 weeks ago while hanging pictures at her house.  The drill did enter the dorsal skin and penetrate through the base of the second toe exiting the plantar skin.  She was seen in the emergency room at [facility] in [place] and the site of injury was stitched closed with 4-0 nylon.  Patient reports that the puncture site was not irrigated while in the emergency room.  She was initially  placed on Keflex and has since finished a course of Bactrim.  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  She has been taking ibuprofen 600 mg 3 times a day consistently for the past 4 to 5 weeks.  She has noticed yellowish type drainage from the puncture wound consistently which seem to have stopped over the past 2 to 3 days.  Denies smoking    No past medical history on file.      Social History     Socioeconomic History     Marital status: Significant other     Spouse name: Not on file     Number of children: Not on file     Years of education: Not on file     Highest education level: Not on file   Occupational History     Not on file   Tobacco Use     Smoking status: Never     Smokeless tobacco: Never   Vaping Use     Vaping status: Not on file   Substance and Sexual Activity     Alcohol use: No     Drug use: No     Sexual activity: Yes     Partners: Male   Other Topics Concern     Parent/sibling w/ CABG, MI or angioplasty before 65F 55M? Not Asked   Social History Narrative     Not on file     Social Determinants of Health     Financial Resource Strain: Not on file   Food Insecurity: Not on file   Transportation Needs: Not on file   Physical Activity: Not on file   Stress: Not on file   Social Connections: Not on file   Intimate Partner Violence: Not on file   Housing Stability: Not on file            Allergies   Allergen Reactions     Loratadine-Pseudoephedrine Er Cramps     Muscle cramps     Milk Products Hives         MEDICATIONS:   Current Outpatient Medications   Medication     PRENATAL VITAMIN TABS   OR     REGLAN 10 MG OR TABS     ZOFRAN 4 MG OR TABS     No current facility-administered medications for this visit.        Family History   Problem Relation Age of Onset     Breast Cancer Maternal Grandmother      Breast Cancer Paternal Aunt           Review of Systems - 10 point Review of Systems is negative except for  "puncture wound right foot which is noted in HPI.    OBJECTIVE:  Appearance: alert, well appearing, and in no distress.    VITAL SIGNS: /79   Pulse 63   Ht 1.676 m (5' 6\")   Wt 68 kg (150 lb)   SpO2 100%   BMI 24.21 kg/m        General appearance: Patient is alert and fully cooperative with history & exam.  No sign of distress is noted during the visit.     Psychiatric: Affect is pleasant & appropriate.  Patient appears motivated to improve health.     Respiratory: Breathing is regular & unlabored while sitting.     HEENT: Hearing is intact to spoken word.  Speech is clear.  No gross evidence of visual impairment that would impact ambulation.      Vascular: Dorsalis pedis and posterior tibial pulses are palpable right.  Mild edema base of second agent right foot.  Dermatologic: Thin top-cover along the dorsal lateral base of the second digit right foot, no erythema identified.  No open wounds along the plantar right forefoot.  There is increased soft tissue thickening within the subcutaneous tissue at this location on the right foot.  Neurologic: All epicritic and proprioceptive sensations are grossly intact right.  Musculoskeletal: Pain palpation along the area of thinning of skin base of the second digit lateral margin right foot.      "

## 2023-06-19 NOTE — PATIENT INSTRUCTIONS
WHAT YOU NEED TO KNOW:    What is a walking boot?  A walking boot is a type of medical shoe used to protect the foot and ankle after an injury or surgery. The boot can be used for broken bones, tendon injuries, severe sprains, or shin splints. A walking boot helps keep the foot stable so it can heal. It can keep your weight off an area, such as your toe, as it heals. Most boots have between 2 and 5 adjustable straps and go mid-way up your calf.              How do I put on the walking boot?  You may want to wear a large sock.  Sit down and place your heel all the way to the back of the boot.  Wrap the soft liner around your foot and leg.  Place the front piece over the liner.  Start to fasten the straps closest to your toes then move up your leg.  Tighten the straps so they are snug but not too tight. The boot should limit movement but not cut off your blood flow.  If your boot has one or more air chambers, pump them up as directed by your healthcare provider.  Stand up and take a few steps to practice walking.    What else do I need to know?  Check your foot and toes often. Check your foot and toes for redness and swelling. If your toes are red, swollen, numb, or tingly, loosen your straps or deflate the air chamber. Over time, the swelling from the injury or surgery will decrease. When this happens, you may need to tighten the straps.  Be careful when you walk on wet surfaces. The boot may be slippery.  Follow the instructions to wash the liner. Remove the liner and wash it by hand in cold water with a mild detergent. Do not use a washing machine or dryer. Place the liner flat to dry. Wash the plastic parts with a damp cloth and mild soap.  Ask about removing the boot to bathe or for motion exercises. You may need to leave the boot on when you bathe. Cover it with a plastic bag and tape the bag closed around your leg.   Naproxen and naproxen sodium oral immediate-release tablets    Brand Names: Aflaxen, Aleve,  Aleve Arthritis, All Day Relief, Anaprox, Anaprox DS, Naprosyn   What is this medicine?  NAPROXEN (na PROX en) is a non-steroidal anti-inflammatory drug (NSAID). It is used to reduce swelling and to treat pain. This medicine may be used for dental pain, headache, or painful monthly periods. It is also used for painful joint and muscular problems such as arthritis, tendinitis, bursitis, and gout.  How should I use this medicine?  Take this medicine by mouth with a glass of water. Follow the directions on the prescription label. Take it with food if your stomach gets upset. Try to not lie down for at least 10 minutes after you take it. Take your medicine at regular intervals. Do not take your medicine more often than directed. Long-term, continuous use may increase the risk of heart attack or stroke.  A special MedGuide will be given to you by the pharmacist with each prescription and refill. Be sure to read this information carefully each time.  Talk to your pediatrician regarding the use of this medicine in children. Special care may be needed.  What side effects may I notice from receiving this medicine?  Side effects that you should report to your doctor or health care professional as soon as possible:  black or bloody stools, blood in the urine or vomit  blurred vision  chest pain  difficulty breathing or wheezing  nausea or vomiting  severe stomach pain  skin rash, skin redness, blistering or peeling skin, hives, or itching  slurred speech or weakness on one side of the body  swelling of eyelids, throat, lips  unexplained weight gain or swelling  unusually weak or tired  yellowing of eyes or skin  Side effects that usually do not require medical attention (report to your doctor or health care professional if they continue or are bothersome):  constipation  headache  heartburn  What may interact with this medicine?  alcohol  aspirin  cidofovir  diuretics  lithium  methotrexate  other drugs for inflammation like  ketorolac or prednisone  pemetrexed  probenecid  warfarin  What if I miss a dose?  If you miss a dose, take it as soon as you can. If it is almost time for your next dose, take only that dose. Do not take double or extra doses.  Where should I keep my medicine?  Keep out of the reach of children.  Store at room temperature between 15 and 30 degrees C (59 and 86 degrees F). Keep container tightly closed. Throw away any unused medicine after the expiration date.  What should I tell my health care provider before I take this medicine?  They need to know if you have any of these conditions:  cigarette smoker  coronary artery bypass graft (CABG) surgery within the past 2 weeks  drink more than 3 alcohol-containing drinks a day  heart disease  high blood pressure  history of stomach bleeding  kidney disease  liver disease  lung or breathing disease, like asthma  an unusual or allergic reaction to naproxen, aspirin, other NSAIDs, other medicines, foods, dyes, or preservatives  pregnant or trying to get pregnant  breast-feeding  What should I watch for while using this medicine?  Tell your doctor or health care professional if your pain does not get better. Talk to your doctor before taking another medicine for pain. Do not treat yourself.  This medicine does not prevent heart attack or stroke. In fact, this medicine may increase the chance of a heart attack or stroke. The chance may increase with longer use of this medicine and in people who have heart disease. If you take aspirin to prevent heart attack or stroke, talk with your doctor or health care professional.  Do not take other medicines that contain aspirin, ibuprofen, or naproxen with this medicine. Side effects such as stomach upset, nausea, or ulcers may be more likely to occur. Many medicines available without a prescription should not be taken with this medicine.  This medicine can cause ulcers and bleeding in the stomach and intestines at any time during  treatment. Do not smoke cigarettes or drink alcohol. These increase irritation to your stomach and can make it more susceptible to damage from this medicine. Ulcers and bleeding can happen without warning symptoms and can cause death.  You may get drowsy or dizzy. Do not drive, use machinery, or do anything that needs mental alertness until you know how this medicine affects you. Do not stand or sit up quickly, especially if you are an older patient. This reduces the risk of dizzy or fainting spells.  This medicine can cause you to bleed more easily. Try to avoid damage to your teeth and gums when you brush or floss your teeth.    Medicine is given to help treat or prevent illness. But if you don t take it correctly, it might not help. It might even harm you. Your healthcare provider or pharmacist can help you learn the right way to take your medicine. Listed below are some tips to help you take medicine safely.  Safety tips  Have a routine for taking each medicine. Make it part of something you do each day, such as brushing your teeth or eating a meal.  When you go to the hospital or your healthcare provider s office, bring all your current medicines in their original boxes or bottles. If you can t do that, bring an up-to-date list of your medicines.  Don't stop taking a prescription medicine unless your healthcare provider tells you to. Doing so could make your condition worse.  Don't share medicines.  Let your healthcare provider and pharmacist know of any allergies you have.  Taking prescription medicines with alcohol, street drugs, herbs, supplements, or even some over-the-counter medicines can be harmful. Talk to your healthcare provider or pharmacist before using any of these things while taking a prescription medicine.  When filling your prescriptions, try using the same pharmacy for all your medicines. If that isn't possible, let each pharmacist know what medicines you are already taking.  Keep medicines  out of the reach of children and pets. Store medicines in a cool, dry, dark place -- not in the bathroom or in the kitchen near moisture or heat.  Don't use medicine that has  or that doesn t look or smell right. Call your pharmacist for instructions on how to dispose of your medicines or where you can take them for safe disposal.  Medicine that comes in a container for a single dose should be used only 1 time. If you use the container a second time, it may have germs in it that can cause illness. These illnesses include hepatitis B and C. They also include infections of the brain or spinal cord (meningitis and epidural abscess).

## 2023-06-19 NOTE — LETTER
6/19/2023         RE: Palma Fang  [address] [facility] [address]        Dear Colleague,    Thank you for referring your patient, Palma Fang, to the [facility]. Please see a copy of my visit note below.          FOOT AND ANKLE SURGERY/PODIATRY CONSULT NOTE         ASSESSMENT: Puncture wound right foot      TREATMENT:  -I discussed with the patient that the puncture wound along the dorsal right forefoot at the base of the second digit appears to have a thin top-cover, without any open lesion noted.  There is also no signs of infection on exam today.    -X-rays obtained at the time of injury on 5/14 were read as negative for fracture or foreign body.    -We reviewed that because she has been walking in gym shoes over the past 5 weeks that her current symptoms may be related to improper offloading of the site of injury.  I recommend limited walking in a cam boot at this time and have referred her for a cam boot today.    -Reviewed that additional imaging in the form of an MRI may be necessary and that any type of retained foreign body may require surgical I&D at a later date if symptoms do not improve over the next 3 to 4 weeks.    -I will start her on naproxen to continue anti-inflammatory over the next 2 weeks.    -I recommend she keep the site of injury on the right foot dry and clean. Shower bag with showering.    -Patient's questions invited and answered. Patient to return to clinic in 3 week(s) for re-evaluation.  She was encouraged to call my office with any further questions or concerns.     Remy Edwards DPM  [facility] Podiatry/Foot & Ankle Surgery      HPI: I was asked to see Palma Fang today for a puncture wound on the right foot.  Patient reports that she dropped a drill on her right foot approximately 5 weeks ago while hanging pictures at her house.  The drill did enter the dorsal skin and penetrate through the base of the second toe exiting the  plantar skin.  She was seen in the emergency room at [facility] in [place] and the site of injury was stitched closed with 4-0 nylon.  Patient reports that the puncture site was not irrigated while in the emergency room.  She was initially placed on Keflex and has since finished a course of Bactrim.  Since the date of injury she did take 1 week off of work but otherwise has been working at [employer] in gym shoes.  She has been taking ibuprofen 600 mg 3 times a day consistently for the past 4 to 5 weeks.  She has noticed yellowish type drainage from the puncture wound consistently which seem to have stopped over the past 2 to 3 days.  Denies smoking    No past medical history on file.      Social History     Socioeconomic History     Marital status: Significant other     Spouse name: Not on file     Number of children: Not on file     Years of education: Not on file     Highest education level: Not on file   Occupational History     Not on file   Tobacco Use     Smoking status: Never     Smokeless tobacco: Never   Vaping Use     Vaping status: Not on file   Substance and Sexual Activity     Alcohol use: No     Drug use: No     Sexual activity: Yes     Partners: Male   Other Topics Concern     Parent/sibling w/ CABG, MI or angioplasty before 65F 55M? Not Asked   Social History Narrative     Not on file     Social Determinants of Health     Financial Resource Strain: Not on file   Food Insecurity: Not on file   Transportation Needs: Not on file   Physical Activity: Not on file   Stress: Not on file   Social Connections: Not on file   Intimate Partner Violence: Not on file   Housing Stability: Not on file            Allergies   Allergen Reactions     Loratadine-Pseudoephedrine Er Cramps     Muscle cramps     Milk Products Hives         MEDICATIONS:   Current Outpatient Medications   Medication     PRENATAL VITAMIN TABS   OR     REGLAN 10 MG OR TABS     ZOFRAN 4 MG OR TABS     No current  "facility-administered medications for this visit.        Family History   Problem Relation Age of Onset     Breast Cancer Maternal Grandmother      Breast Cancer Paternal Aunt           Review of Systems - 10 point Review of Systems is negative except for puncture wound right foot which is noted in HPI.    OBJECTIVE:  Appearance: alert, well appearing, and in no distress.    VITAL SIGNS: /79   Pulse 63   Ht 1.676 m (5' 6\")   Wt 68 kg (150 lb)   SpO2 100%   BMI 24.21 kg/m        General appearance: Patient is alert and fully cooperative with history & exam.  No sign of distress is noted during the visit.     Psychiatric: Affect is pleasant & appropriate.  Patient appears motivated to improve health.     Respiratory: Breathing is regular & unlabored while sitting.     HEENT: Hearing is intact to spoken word.  Speech is clear.  No gross evidence of visual impairment that would impact ambulation.      Vascular: Dorsalis pedis and posterior tibial pulses are palpable right.  Mild edema base of second agent right foot.  Dermatologic: Thin top-cover along the dorsal lateral base of the second digit right foot, no erythema identified.  No open wounds along the plantar right forefoot.  There is increased soft tissue thickening within the subcutaneous tissue at this location on the right foot.  Neurologic: All epicritic and proprioceptive sensations are grossly intact right.  Musculoskeletal: Pain palpation along the area of thinning of skin base of the second digit lateral margin right foot.          Again, thank you for allowing me to participate in the care of your patient.        Sincerely,        Remy Edwards DPM    "

## 2023-07-10 ENCOUNTER — OFFICE VISIT (OUTPATIENT)
Dept: PODIATRY | Facility: CLINIC | Age: 53
End: 2023-07-10
Payer: COMMERCIAL

## 2023-07-10 VITALS
HEART RATE: 61 BPM | DIASTOLIC BLOOD PRESSURE: 78 MMHG | SYSTOLIC BLOOD PRESSURE: 114 MMHG | WEIGHT: 150 LBS | OXYGEN SATURATION: 99 % | BODY MASS INDEX: 24.11 KG/M2 | HEIGHT: 66 IN

## 2023-07-10 DIAGNOSIS — S91.331A PUNCTURE WOUND OF RIGHT FOOT, INITIAL ENCOUNTER: Primary | ICD-10-CM

## 2023-07-10 PROCEDURE — 99213 OFFICE O/P EST LOW 20 MIN: CPT | Performed by: PODIATRIST

## 2023-07-10 ASSESSMENT — PAIN SCALES - GENERAL: PAINLEVEL: NO PAIN (0)

## 2023-07-10 NOTE — LETTER
7/10/2023         RE: Palma Fang   [address] [place] [facility] [address]        Dear Colleague,    Thank you for referring your patient, Palma Fang, to the [facility]. Please see a copy of my visit note below.        FOOT AND ANKLE SURGERY/PODIATRY PROGRESS NOTE        ASSESSMENT: Puncture wound right foot      TREATMENT:  -I discussed with the patient that the puncture wound on the right foot is healed without signs of infection and only minimal inflammation.    -I am pleased with her progress and recommend she continue to remain limited walking for the next 3 to 4 weeks in regular shoes to allow for decrease in inflammation and decrease in scar tissue formation.  Recommend use of the cam boot if symptoms appear to worsen.    -Patient's questions invited and answered.  She is discharged from my care at this time but was encouraged to call my office with any further questions or concerns.     Remy Edwards DPM  [facility] Podiatry/Foot & Ankle Surgery        HPI: Palma Fang was seen again today for a puncture wound on the dorsal right forefoot.  Patient reports that she has been using the cam boot as directed and yesterday went for a long walk in regular shoes without discomfort.  She does describe some tenderness along the site without acute pain.    No past medical history on file.    Past Surgical History:   Procedure Laterality Date     HC REDUCTION OF LARGE BREAST      Description: Breast Surgery Reduction Procedure;  Recorded: 2012;     MAMMOPLASTY REDUCTION       ME VAGINAL HYSTERECTOMY,UTERUS 250 GMS/<      Description: Vaginal Hysterectomy;  Recorded: 2014;     [facility]  DELIVERY ONLY      Description:  Section;  Recorded: 2012;       Allergies   Allergen Reactions     Loratadine-Pseudoephedrine Er Cramps     Muscle cramps     Milk Products Hives         Current Outpatient Medications:      naproxen (NAPROSYN) 500 MG tablet, Take  "1 tablet (500 mg) by mouth 2 times daily (with meals), Disp: 28 tablet, Rfl: 0    Family History   Problem Relation Age of Onset     Breast Cancer Maternal Grandmother      Breast Cancer Paternal Aunt        Social History     Socioeconomic History     Marital status: Significant other     Spouse name: Not on file     Number of children: Not on file     Years of education: Not on file     Highest education level: Not on file   Occupational History     Not on file   Tobacco Use     Smoking status: Never     Smokeless tobacco: Never   Substance and Sexual Activity     Alcohol use: No     Drug use: No     Sexual activity: Yes     Partners: Male   Other Topics Concern     Parent/sibling w/ CABG, MI or angioplasty before 65F 55M? Not Asked   Social History Narrative     Not on file     Social Determinants of Health     Financial Resource Strain: Not on file   Food Insecurity: Not on file   Transportation Needs: Not on file   Physical Activity: Not on file   Stress: Not on file   Social Connections: Not on file   Intimate Partner Violence: Not on file   Housing Stability: Not on file       10 point Review of Systems is negative except for puncture wound right foot which is noted in HPI.     /78   Pulse 61   Ht 1.676 m (5' 6\")   Wt 68 kg (150 lb)   SpO2 99%   BMI 24.21 kg/m      BMI= Body mass index is 24.21 kg/m .    OBJECTIVE:  General appearance: Patient is alert and fully cooperative with history & exam.  No sign of distress is noted during the visit.    Vascular: Dorsalis pedis and posterior tibial pulses are palpable right.  Mild edema base of second agent right foot.  Dermatologic:  No open lesions along the dorsal lateral base of the second digit right foot, no erythema identified.  No open wounds along the plantar right forefoot.  There is increased soft tissue thickening within the subcutaneous tissue at this location on the right foot.  Neurologic: All epicritic and proprioceptive sensations are " grossly intact right.  Musculoskeletal: No pain palpation along the area of thinning of skin base of the second digit lateral margin right foot.        Again, thank you for allowing me to participate in the care of your patient.        Sincerely,        Remy Edwards DPM

## 2023-07-10 NOTE — PROGRESS NOTES
FOOT AND ANKLE SURGERY/PODIATRY PROGRESS NOTE        ASSESSMENT: Puncture wound right foot      TREATMENT:  -I discussed with the patient that the puncture wound on the right foot is healed without signs of infection and only minimal inflammation.    -I am pleased with her progress and recommend she continue to remain limited walking for the next 3 to 4 weeks in regular shoes to allow for decrease in inflammation and decrease in scar tissue formation.  Recommend use of the cam boot if symptoms appear to worsen.    -Patient's questions invited and answered.  She is discharged from my care at this time but was encouraged to call my office with any further questions or concerns.     Remy Edwards DPM  [facility] Podiatry/Foot & Ankle Surgery        HPI: Palma Fang was seen again today for a puncture wound on the dorsal right forefoot.  Patient reports that she has been using the cam boot as directed and yesterday went for a long walk in regular shoes without discomfort.  She does describe some tenderness along the site without acute pain.    No past medical history on file.    Past Surgical History:   Procedure Laterality Date     HC REDUCTION OF LARGE BREAST      Description: Breast Surgery Reduction Procedure;  Recorded: 2012;     MAMMOPLASTY REDUCTION       NE VAGINAL HYSTERECTOMY,UTERUS 250 GMS/<      Description: Vaginal Hysterectomy;  Recorded: 2014;     [facility]  DELIVERY ONLY      Description:  Section;  Recorded: 2012;       Allergies   Allergen Reactions     Loratadine-Pseudoephedrine Er Cramps     Muscle cramps     Milk Products Hives         Current Outpatient Medications:      naproxen (NAPROSYN) 500 MG tablet, Take 1 tablet (500 mg) by mouth 2 times daily (with meals), Disp: 28 tablet, Rfl: 0    Family History   Problem Relation Age of Onset     Breast Cancer Maternal Grandmother      Breast Cancer Paternal Aunt        Social History     Socioeconomic History  "    Marital status: Significant other     Spouse name: Not on file     Number of children: Not on file     Years of education: Not on file     Highest education level: Not on file   Occupational History     Not on file   Tobacco Use     Smoking status: Never     Smokeless tobacco: Never   Substance and Sexual Activity     Alcohol use: No     Drug use: No     Sexual activity: Yes     Partners: Male   Other Topics Concern     Parent/sibling w/ CABG, MI or angioplasty before 65F 55M? Not Asked   Social History Narrative     Not on file     Social Determinants of Health     Financial Resource Strain: Not on file   Food Insecurity: Not on file   Transportation Needs: Not on file   Physical Activity: Not on file   Stress: Not on file   Social Connections: Not on file   Intimate Partner Violence: Not on file   Housing Stability: Not on file       10 point Review of Systems is negative except for puncture wound right foot which is noted in HPI.     /78   Pulse 61   Ht 1.676 m (5' 6\")   Wt 68 kg (150 lb)   SpO2 99%   BMI 24.21 kg/m      BMI= Body mass index is 24.21 kg/m .    OBJECTIVE:  General appearance: Patient is alert and fully cooperative with history & exam.  No sign of distress is noted during the visit.    Vascular: Dorsalis pedis and posterior tibial pulses are palpable right.  Mild edema base of second agent right foot.  Dermatologic:  No open lesions along the dorsal lateral base of the second digit right foot, no erythema identified.  No open wounds along the plantar right forefoot.  There is increased soft tissue thickening within the subcutaneous tissue at this location on the right foot.  Neurologic: All epicritic and proprioceptive sensations are grossly intact right.  Musculoskeletal: No pain palpation along the area of thinning of skin base of the second digit lateral margin right foot.    "